# Patient Record
Sex: FEMALE | Race: WHITE | NOT HISPANIC OR LATINO | Employment: OTHER | URBAN - METROPOLITAN AREA
[De-identification: names, ages, dates, MRNs, and addresses within clinical notes are randomized per-mention and may not be internally consistent; named-entity substitution may affect disease eponyms.]

---

## 2017-09-26 ENCOUNTER — EST. PATIENT EMERGENCY (OUTPATIENT)
Dept: URBAN - METROPOLITAN AREA CLINIC 43 | Facility: CLINIC | Age: 57
End: 2017-09-26

## 2017-09-26 DIAGNOSIS — H43.812: ICD-10-CM

## 2017-09-26 DIAGNOSIS — H33.302: ICD-10-CM

## 2017-09-26 PROCEDURE — 92012 INTRM OPH EXAM EST PATIENT: CPT

## 2017-09-26 ASSESSMENT — TONOMETRY
OD_IOP_MMHG: 13
OS_IOP_MMHG: 14

## 2017-09-26 ASSESSMENT — VISUAL ACUITY
OS_CC: 20/25-2
OD_CC: 20/25

## 2017-10-20 ENCOUNTER — DILATED FUNDUS EXAM (OUTPATIENT)
Dept: URBAN - METROPOLITAN AREA CLINIC 43 | Facility: CLINIC | Age: 57
End: 2017-10-20

## 2017-10-20 DIAGNOSIS — H43.812: ICD-10-CM

## 2017-10-20 PROCEDURE — 92012 INTRM OPH EXAM EST PATIENT: CPT

## 2017-10-20 ASSESSMENT — TONOMETRY
OS_IOP_MMHG: 14
OD_IOP_MMHG: 14

## 2017-10-20 ASSESSMENT — VISUAL ACUITY
OS_SC: 20/25
OD_SC: 20/30

## 2018-04-20 ENCOUNTER — EST. PATIENT EMERGENCY (OUTPATIENT)
Dept: URBAN - METROPOLITAN AREA CLINIC 43 | Facility: CLINIC | Age: 58
End: 2018-04-20

## 2018-04-20 DIAGNOSIS — H04.123: ICD-10-CM

## 2018-04-20 PROCEDURE — 99212 OFFICE O/P EST SF 10 MIN: CPT

## 2018-04-20 ASSESSMENT — VISUAL ACUITY
OS_CC: 20/80
OD_CC: J6
OS_CC: J2
OD_SC: 20/25

## 2018-05-21 ENCOUNTER — CONTACT LENS - FOLLOW UP (OUTPATIENT)
Dept: URBAN - METROPOLITAN AREA CLINIC 43 | Facility: CLINIC | Age: 58
End: 2018-05-21

## 2018-05-21 DIAGNOSIS — Z97.3: ICD-10-CM

## 2018-05-21 DIAGNOSIS — H04.123: ICD-10-CM

## 2018-05-21 PROCEDURE — 92310F

## 2018-05-21 ASSESSMENT — VISUAL ACUITY
OS_CC: 20/30+2
OD_CC: J6
OS_CC: J3
OD_CC: 20/25-2
OU_CC: J2
OU_CC: 20/20

## 2018-06-29 ENCOUNTER — ESTABLISHED COMPREHENSIVE EXAM (OUTPATIENT)
Dept: URBAN - METROPOLITAN AREA CLINIC 43 | Facility: CLINIC | Age: 58
End: 2018-06-29

## 2018-06-29 DIAGNOSIS — H43.812: ICD-10-CM

## 2018-06-29 DIAGNOSIS — H04.123: ICD-10-CM

## 2018-06-29 DIAGNOSIS — H25.093: ICD-10-CM

## 2018-06-29 PROCEDURE — 92014 COMPRE OPH EXAM EST PT 1/>: CPT

## 2018-06-29 PROCEDURE — 92015 DETERMINE REFRACTIVE STATE: CPT

## 2018-06-29 ASSESSMENT — VISUAL ACUITY
OD_CC: 20/20-1
OD_SC: J2
OS_CC: J2
OS_CC: 20/20-1
OS_SC: 20/200
OD_SC: 20/200
OD_CC: J1
OS_SC: J2

## 2018-06-29 ASSESSMENT — TONOMETRY
OD_IOP_MMHG: 16
OS_IOP_MMHG: 16

## 2019-07-24 ENCOUNTER — ESTABLISHED COMPREHENSIVE EXAM (OUTPATIENT)
Dept: URBAN - METROPOLITAN AREA CLINIC 43 | Facility: CLINIC | Age: 59
End: 2019-07-24

## 2019-07-24 DIAGNOSIS — H25.093: ICD-10-CM

## 2019-07-24 DIAGNOSIS — H33.302: ICD-10-CM

## 2019-07-24 DIAGNOSIS — H43.812: ICD-10-CM

## 2019-07-24 DIAGNOSIS — H04.123: ICD-10-CM

## 2019-07-24 PROCEDURE — 92015 DETERMINE REFRACTIVE STATE: CPT

## 2019-07-24 PROCEDURE — 92014 COMPRE OPH EXAM EST PT 1/>: CPT

## 2019-07-24 ASSESSMENT — VISUAL ACUITY
OD_SC: J1
OD_SC: 20/400
OS_SC: J8
OS_SC: 20/400

## 2019-07-24 ASSESSMENT — TONOMETRY
OS_IOP_MMHG: 15
OD_IOP_MMHG: 15

## 2020-02-10 ENCOUNTER — EST. PATIENT EMERGENCY (OUTPATIENT)
Dept: URBAN - METROPOLITAN AREA CLINIC 39 | Facility: CLINIC | Age: 60
End: 2020-02-10

## 2020-02-10 DIAGNOSIS — H16.042: ICD-10-CM

## 2020-02-10 DIAGNOSIS — H04.123: ICD-10-CM

## 2020-02-10 DIAGNOSIS — H25.093: ICD-10-CM

## 2020-02-10 PROCEDURE — 92012 INTRM OPH EXAM EST PATIENT: CPT

## 2020-02-10 RX ORDER — OFLOXACIN 3 MG/ML: 1 SOLUTION/ DROPS OPHTHALMIC

## 2020-02-10 RX ORDER — DUREZOL 0.5 MG/ML: 1 EMULSION OPHTHALMIC ONCE A DAY

## 2020-02-10 ASSESSMENT — TONOMETRY: OD_IOP_MMHG: 16

## 2020-02-10 ASSESSMENT — VISUAL ACUITY
OS_CC: 20/25+2
OD_CC: 20/20

## 2020-02-19 ENCOUNTER — FOLLOW UP (OUTPATIENT)
Dept: URBAN - METROPOLITAN AREA CLINIC 43 | Facility: CLINIC | Age: 60
End: 2020-02-19

## 2020-02-19 DIAGNOSIS — H16.042: ICD-10-CM

## 2020-02-19 PROCEDURE — 99212 OFFICE O/P EST SF 10 MIN: CPT

## 2020-02-19 ASSESSMENT — VISUAL ACUITY
OS_CC: 20/30+2
OD_CC: 20/25+2

## 2020-02-19 ASSESSMENT — TONOMETRY: OD_IOP_MMHG: 16

## 2020-07-29 ENCOUNTER — ESTABLISHED COMPREHENSIVE EXAM (OUTPATIENT)
Dept: URBAN - METROPOLITAN AREA CLINIC 43 | Facility: CLINIC | Age: 60
End: 2020-07-29

## 2020-07-29 DIAGNOSIS — H04.123: ICD-10-CM

## 2020-07-29 DIAGNOSIS — H33.302: ICD-10-CM

## 2020-07-29 DIAGNOSIS — H43.812: ICD-10-CM

## 2020-07-29 DIAGNOSIS — H25.813: ICD-10-CM

## 2020-07-29 PROCEDURE — 92014 COMPRE OPH EXAM EST PT 1/>: CPT

## 2020-07-29 PROCEDURE — 92015 DETERMINE REFRACTIVE STATE: CPT

## 2020-07-29 ASSESSMENT — VISUAL ACUITY
OD_CC: 20/25-2
OD_SC: 20/400
OD_SC: J1
OS_CC: 20/25-1
OS_CC: J2
OD_CC: J4
OS_SC: J10
OS_SC: 20/400

## 2020-07-29 ASSESSMENT — TONOMETRY
OS_IOP_MMHG: 15
OD_IOP_MMHG: 17

## 2020-09-22 ENCOUNTER — EST. PATIENT EMERGENCY (OUTPATIENT)
Dept: URBAN - METROPOLITAN AREA CLINIC 43 | Facility: CLINIC | Age: 60
End: 2020-09-22

## 2020-09-22 DIAGNOSIS — H04.123: ICD-10-CM

## 2020-09-22 DIAGNOSIS — H43.812: ICD-10-CM

## 2020-09-22 DIAGNOSIS — H43.811: ICD-10-CM

## 2020-09-22 DIAGNOSIS — H25.813: ICD-10-CM

## 2020-09-22 PROCEDURE — 92134 CPTRZ OPH DX IMG PST SGM RTA: CPT

## 2020-09-22 PROCEDURE — 92014 COMPRE OPH EXAM EST PT 1/>: CPT

## 2020-09-22 ASSESSMENT — TONOMETRY
OD_IOP_MMHG: 12
OS_IOP_MMHG: 14

## 2020-09-22 ASSESSMENT — VISUAL ACUITY
OS_CC: 20/25
OD_CC: 20/25-2

## 2020-11-24 NOTE — PATIENT DISCUSSION
New Prescription: Lotemax SM (loteprednol etabonate): drops,gel: 0.38% 1 drop twice a day into both eyes 11-

## 2020-11-24 NOTE — PATIENT DISCUSSION
EXPOSURE KERATOCUNJUNCTIVITIS: PRESCRIBED PRESERVATIVE FREE ARTIFICIAL TEARS 4-6X A DAY, OU AND THE DAILY INTAKE OF OMEGA-3 DHA/EPA FATTY ACIDS TO HELP RELIEVE SYMPTOMS. ADD NIGHTLY LUBRICATING OINTMENT OR GEL. WILL CONSIDER RESTASIS OR PUNCTAL PLUGS AND/OR LIPIFLOW TREATMENT NEXT VISIT IF NOT RESPONSIVE OR IF SYMPTOMS PERSIST. RETURN FOR FOLLOW-UP AS SCHEDULED OR SOONER IF SYMPTOMS WORSEN.

## 2020-11-24 NOTE — PATIENT DISCUSSION
MEIBOMIAN GLAND DYSFUNCTION, OU:  PRESCRIBE WARM COMPRESSES AND EYELID SCRUBS QD-BID, ARTIFICIAL TEARS BID-QID, THE DAILY INTAKE OF OMEGA-3 FATTY ACIDS . WILL CONSIDER LIPIFLOW TREATMENT NEXT VISIT IF NOT RESPONSIVE TO TREATMENT OR IF SYMPTOMS PERSIST. RETURN FOR FOLLOW-UP AS SCHEDULED.

## 2021-01-26 NOTE — PATIENT DISCUSSION
EXPOSURE KERATOCUNJUNCTIVITIS: PRESCRIBED PRESERVATIVE FREE ARTIFICIAL TEARS 4-6X A DAY, OU AND THE DAILY INTAKE OF OMEGA-3 DHA/EPA FATTY ACIDS TO HELP RELIEVE SYMPTOMS. ADD NIGHTLY LUBRICATING OINTMENT OR GEL.

## 2021-01-26 NOTE — PATIENT DISCUSSION
MEIBOMIAN GLAND DYSFUNCTION, OU: PRESCRIBE WARM COMPRESSES AND EYELID SCRUBS QD-BID, ARTIFICIAL TEARS BID-QID, THE DAILY INTAKE OF OMEGA-3 FATTY ACIDS .

## 2021-01-26 NOTE — PATIENT DISCUSSION
New Prescription: Lotemax SM (loteprednol etabonate): drops,gel: 0.38% 1 drop twice a day as needed into both eyes 01-

## 2021-01-26 NOTE — PATIENT DISCUSSION
K SICCA OU: NO IMPROVEMENT WITH PRESERVATIVE FREE ARTIFICIAL TEARS 4-6X A DAY, OU ADD NIGHTLY LUBRICATING OINTMENT OR GEL. NO TOLERENCE  [address]. OKAY TO CONTINUE LOTEMAX PRN.

## 2021-04-07 NOTE — PATIENT DISCUSSION
New Prescription: Lotemax (loteprednol etabonate): ointment: 0.5% a small amount at bedtime into both eyes 04-

## 2021-07-28 ENCOUNTER — ESTABLISHED COMPREHENSIVE EXAM (OUTPATIENT)
Dept: URBAN - METROPOLITAN AREA CLINIC 43 | Facility: CLINIC | Age: 61
End: 2021-07-28

## 2021-07-28 DIAGNOSIS — H04.123: ICD-10-CM

## 2021-07-28 DIAGNOSIS — H02.834: ICD-10-CM

## 2021-07-28 DIAGNOSIS — H02.831: ICD-10-CM

## 2021-07-28 DIAGNOSIS — H02.403: ICD-10-CM

## 2021-07-28 DIAGNOSIS — H25.813: ICD-10-CM

## 2021-07-28 PROCEDURE — 92014 COMPRE OPH EXAM EST PT 1/>: CPT

## 2021-07-28 PROCEDURE — 92015 DETERMINE REFRACTIVE STATE: CPT

## 2021-07-28 ASSESSMENT — VISUAL ACUITY
OD_SC: 20/400
OS_SC: 20/400
OD_CC: 20/20-1
OS_CC: 20/25
OS_CC: J1
OS_SC: J1
OD_SC: J1
OD_CC: J1

## 2021-07-28 ASSESSMENT — TONOMETRY
OS_IOP_MMHG: 13
OD_IOP_MMHG: 14

## 2021-10-07 ENCOUNTER — ESTABLISHED PATIENT (OUTPATIENT)
Dept: URBAN - METROPOLITAN AREA CLINIC 36 | Facility: CLINIC | Age: 61
End: 2021-10-07

## 2021-10-07 DIAGNOSIS — L98.8: ICD-10-CM

## 2021-10-07 DIAGNOSIS — H02.832: ICD-10-CM

## 2021-10-07 DIAGNOSIS — H25.813: ICD-10-CM

## 2021-10-07 DIAGNOSIS — H02.413: ICD-10-CM

## 2021-10-07 DIAGNOSIS — H02.835: ICD-10-CM

## 2021-10-07 DIAGNOSIS — H04.123: ICD-10-CM

## 2021-10-07 DIAGNOSIS — H02.403: ICD-10-CM

## 2021-10-07 PROCEDURE — 92285 EXTERNAL OCULAR PHOTOGRAPHY: CPT

## 2021-10-07 PROCEDURE — 99213 OFFICE O/P EST LOW 20 MIN: CPT

## 2021-10-07 RX ORDER — OXYMETAZOLINE HYDROCHLORIDE OPHTHALMIC 1 MG/ML: 1 SOLUTION/ DROPS OPHTHALMIC ONCE A DAY

## 2021-10-07 ASSESSMENT — VISUAL ACUITY
OD_CC: 20/25
OS_CC: 20/25

## 2021-10-11 ENCOUNTER — TECH ONLY (OUTPATIENT)
Dept: URBAN - METROPOLITAN AREA CLINIC 43 | Facility: CLINIC | Age: 61
End: 2021-10-11

## 2021-10-11 DIAGNOSIS — H02.403: ICD-10-CM

## 2021-10-11 DIAGNOSIS — H02.839: ICD-10-CM

## 2021-10-11 DIAGNOSIS — H02.413: ICD-10-CM

## 2021-10-11 PROCEDURE — 92082 INTERMEDIATE VISUAL FIELD XM: CPT

## 2021-10-11 PROCEDURE — 99211T TECH SERVICE

## 2022-01-25 ENCOUNTER — CONSULTATION/EVALUATION (OUTPATIENT)
Dept: URBAN - METROPOLITAN AREA CLINIC 43 | Facility: CLINIC | Age: 62
End: 2022-01-25

## 2022-01-25 DIAGNOSIS — H43.812: ICD-10-CM

## 2022-01-25 DIAGNOSIS — H33.302: ICD-10-CM

## 2022-01-25 DIAGNOSIS — H43.811: ICD-10-CM

## 2022-01-25 DIAGNOSIS — H04.123: ICD-10-CM

## 2022-01-25 DIAGNOSIS — H25.811: ICD-10-CM

## 2022-01-25 DIAGNOSIS — H25.812: ICD-10-CM

## 2022-01-25 PROCEDURE — 92025AV CORNEAL TOPOGRAPHY, AV

## 2022-01-25 PROCEDURE — 92015 DETERMINE REFRACTIVE STATE: CPT

## 2022-01-25 PROCEDURE — 92136TC INTERFEROMETRY - TECHNICAL COMPONENT

## 2022-01-25 PROCEDURE — 99214 OFFICE O/P EST MOD 30 MIN: CPT

## 2022-01-25 PROCEDURE — V2799PMN IMPRIMIS PRED-MOXI-NEPAF 5ML

## 2022-01-25 PROCEDURE — 92134 CPTRZ OPH DX IMG PST SGM RTA: CPT

## 2022-01-25 ASSESSMENT — VISUAL ACUITY
OS_BAT: 20/50
OS_CC: J2
OD_CC: J3
OD_SC: 20/200
OD_RAM: 20/20
OS_SC: J2
OS_SC: 20/400
OS_AM: 20/20
OD_CC: 20/30+2
OS_CC: 20/25-2
OD_BAT: 20/50
OD_SC: J1

## 2022-01-25 ASSESSMENT — TONOMETRY
OS_IOP_MMHG: 15
OD_IOP_MMHG: 14

## 2022-02-17 ENCOUNTER — PRE-OP/H&P (OUTPATIENT)
Dept: URBAN - METROPOLITAN AREA CLINIC 44 | Facility: CLINIC | Age: 62
End: 2022-02-17

## 2022-02-17 DIAGNOSIS — H02.413: ICD-10-CM

## 2022-02-17 DIAGNOSIS — H02.835: ICD-10-CM

## 2022-02-17 DIAGNOSIS — H04.123: ICD-10-CM

## 2022-02-17 DIAGNOSIS — H02.832: ICD-10-CM

## 2022-02-17 DIAGNOSIS — L98.8: ICD-10-CM

## 2022-02-17 DIAGNOSIS — H02.403: ICD-10-CM

## 2022-02-17 DIAGNOSIS — H25.813: ICD-10-CM

## 2022-02-17 PROCEDURE — 99211HP H&P OFFICE/OUTPATIENT VISIT, EST

## 2022-02-17 RX ORDER — CEPHALEXIN 500 MG/1
1 CAPSULE ORAL TWICE A DAY
Start: 2022-03-08

## 2022-02-17 RX ORDER — ONDANSETRON HYDROCHLORIDE 8 MG/1
TABLET, FILM COATED ORAL ONCE A DAY
Start: 2022-03-08

## 2022-02-17 RX ORDER — TOBRAMYCIN AND DEXAMETHASONE 1; 3 MG/ML; MG/ML
1 SUSPENSION/ DROPS OPHTHALMIC
Start: 2022-03-08

## 2022-02-17 RX ORDER — ERYTHROMYCIN 5 MG/G
OINTMENT OPHTHALMIC
Start: 2022-03-08

## 2022-03-21 ENCOUNTER — PRE-OP/H&P (OUTPATIENT)
Dept: URBAN - METROPOLITAN AREA SURGERY 14 | Facility: SURGERY | Age: 62
End: 2022-03-21

## 2022-03-21 ENCOUNTER — SURGERY/PROCEDURE (OUTPATIENT)
Dept: URBAN - METROPOLITAN AREA SURGERY 14 | Facility: SURGERY | Age: 62
End: 2022-03-21

## 2022-03-21 DIAGNOSIS — H02.403: ICD-10-CM

## 2022-03-21 DIAGNOSIS — H02.835: ICD-10-CM

## 2022-03-21 DIAGNOSIS — H02.832: ICD-10-CM

## 2022-03-21 DIAGNOSIS — Z41.1: ICD-10-CM

## 2022-03-21 DIAGNOSIS — L98.8: ICD-10-CM

## 2022-03-21 DIAGNOSIS — H02.413: ICD-10-CM

## 2022-03-21 PROCEDURE — 15829F LOWER FACELIFT/PLATYSMAPLASTY ~ FEMALE

## 2022-03-21 PROCEDURE — 15821 BLEPHARP LWR EYELID FAT PAD: CPT

## 2022-03-21 PROCEDURE — 67904SF LEVATOR APONEUROSIS ADV W/ FAT COSMETIC PER EYE

## 2022-03-21 PROCEDURE — 99211T TECH SERVICE

## 2022-03-21 PROCEDURE — 67904 REPAIR EYELID DEFECT: CPT

## 2022-03-22 ENCOUNTER — POST-OP (OUTPATIENT)
Dept: URBAN - METROPOLITAN AREA CLINIC 39 | Facility: CLINIC | Age: 62
End: 2022-03-22

## 2022-03-22 DIAGNOSIS — Z98.890: ICD-10-CM

## 2022-03-22 PROCEDURE — 99024 POSTOP FOLLOW-UP VISIT: CPT

## 2022-03-22 ASSESSMENT — VISUAL ACUITY
OD_SC: 20/40
OS_SC: 20/40

## 2022-03-28 ENCOUNTER — POST-OP (OUTPATIENT)
Dept: URBAN - METROPOLITAN AREA CLINIC 39 | Facility: CLINIC | Age: 62
End: 2022-03-28

## 2022-03-28 DIAGNOSIS — Z98.890: ICD-10-CM

## 2022-03-28 DIAGNOSIS — H02.403: ICD-10-CM

## 2022-03-28 PROCEDURE — 99024 POSTOP FOLLOW-UP VISIT: CPT

## 2022-03-28 ASSESSMENT — VISUAL ACUITY
OD_CC: 20/25
OS_CC: 20/25-1

## 2022-04-07 ENCOUNTER — POST-OP (OUTPATIENT)
Dept: URBAN - METROPOLITAN AREA CLINIC 43 | Facility: CLINIC | Age: 62
End: 2022-04-07

## 2022-04-07 DIAGNOSIS — Z98.890: ICD-10-CM

## 2022-04-07 PROCEDURE — 99024 POSTOP FOLLOW-UP VISIT: CPT

## 2022-04-07 ASSESSMENT — VISUAL ACUITY
OS_CC: 20/25
OD_CC: 20/25

## 2022-04-25 NOTE — PROCEDURE NOTE: CLINICAL
PROCEDURE NOTE: Punctal Plugs, Extended Bilateral Lower Lids. Diagnosis: Keratoconjunctivitis Sicca, Not Specified As Sjögren's. Prior to treatment, the risks/benefits/alternatives were discussed. The patient wished to proceed with procedure. Extended duration plugs were inserted. Brand: Oasis Form Fit. Size: 0.3mmx2.5mm Location: bilateral lower punctum. Patient tolerated procedure well. There were no complications. Post procedure instructions given. Luis Daniel Colby

## 2022-05-03 ENCOUNTER — POST-OP (OUTPATIENT)
Dept: URBAN - METROPOLITAN AREA CLINIC 39 | Facility: CLINIC | Age: 62
End: 2022-05-03

## 2022-05-03 DIAGNOSIS — Z98.890: ICD-10-CM

## 2022-05-03 PROCEDURE — 99024 POSTOP FOLLOW-UP VISIT: CPT

## 2022-05-03 ASSESSMENT — VISUAL ACUITY
OS_CC: 20/25
OD_CC: 20/25

## 2022-09-12 ENCOUNTER — CONSULTATION/EVALUATION (OUTPATIENT)
Dept: URBAN - METROPOLITAN AREA CLINIC 39 | Facility: CLINIC | Age: 62
End: 2022-09-12

## 2022-09-12 DIAGNOSIS — H43.811: ICD-10-CM

## 2022-09-12 DIAGNOSIS — H04.123: ICD-10-CM

## 2022-09-12 DIAGNOSIS — H35.371: ICD-10-CM

## 2022-09-12 DIAGNOSIS — H43.812: ICD-10-CM

## 2022-09-12 DIAGNOSIS — H25.813: ICD-10-CM

## 2022-09-12 DIAGNOSIS — H52.13: ICD-10-CM

## 2022-09-12 PROCEDURE — 92025AV CORNEAL TOPOGRAPHY, AV

## 2022-09-12 PROCEDURE — 92136TC INTERFEROMETRY - TECHNICAL COMPONENT

## 2022-09-12 PROCEDURE — 92134 CPTRZ OPH DX IMG PST SGM RTA: CPT

## 2022-09-12 PROCEDURE — V2799PMN IMPRIMIS PRED-MOXI-NEPAF 5ML

## 2022-09-12 PROCEDURE — 99214 OFFICE O/P EST MOD 30 MIN: CPT

## 2022-09-12 PROCEDURE — 92015 DETERMINE REFRACTIVE STATE: CPT

## 2022-09-12 ASSESSMENT — VISUAL ACUITY
OS_BAT: 20/50
OS_CC: J1
OD_SC: 20/200
OD_CC: J1
OS_CC: 20/20-1
OD_SC: J1
OS_SC: J1
OD_CC: 20/20-1
OS_SC: 20/400
OD_BAT: 20/50

## 2022-09-12 ASSESSMENT — TONOMETRY
OS_IOP_MMHG: 16
OD_IOP_MMHG: 16

## 2022-09-20 ENCOUNTER — PRE-OP/H&P (OUTPATIENT)
Dept: URBAN - METROPOLITAN AREA CLINIC 39 | Facility: CLINIC | Age: 62
End: 2022-09-20

## 2022-09-20 ENCOUNTER — SURGERY/PROCEDURE (OUTPATIENT)
Dept: URBAN - METROPOLITAN AREA CLINIC 39 | Facility: CLINIC | Age: 62
End: 2022-09-20

## 2022-09-20 DIAGNOSIS — Z98.890: ICD-10-CM

## 2022-09-20 DIAGNOSIS — H04.123: ICD-10-CM

## 2022-09-20 DIAGNOSIS — H35.371: ICD-10-CM

## 2022-09-20 DIAGNOSIS — H25.813: ICD-10-CM

## 2022-09-20 DIAGNOSIS — H02.403: ICD-10-CM

## 2022-09-20 PROCEDURE — 65772LRI LRI DURING CAT SX

## 2022-09-20 PROCEDURE — 99211T TECH SERVICE

## 2022-09-20 PROCEDURE — 66984AV REMOVE CATARACT, INSERT ADVANCED LENS

## 2022-09-20 PROCEDURE — 66999LNSR LENSAR LASER FOR CAT SX

## 2022-09-21 ENCOUNTER — POST-OP (OUTPATIENT)
Dept: URBAN - METROPOLITAN AREA CLINIC 43 | Facility: CLINIC | Age: 62
End: 2022-09-21

## 2022-09-21 DIAGNOSIS — Z96.1: ICD-10-CM

## 2022-09-21 PROCEDURE — 99024 POSTOP FOLLOW-UP VISIT: CPT

## 2022-09-21 ASSESSMENT — TONOMETRY
OS_IOP_MMHG: 14
OD_IOP_MMHG: 14

## 2022-09-21 ASSESSMENT — VISUAL ACUITY
OS_SC: 20/30-1
OD_SC: J1
OD_SC: 20/200
OS_SC: J1
OS: J4

## 2022-09-23 ENCOUNTER — POST OP/EVAL OF SECOND EYE (OUTPATIENT)
Dept: URBAN - METROPOLITAN AREA CLINIC 43 | Facility: CLINIC | Age: 62
End: 2022-09-23

## 2022-09-23 DIAGNOSIS — H33.302: ICD-10-CM

## 2022-09-23 DIAGNOSIS — H35.371: ICD-10-CM

## 2022-09-23 DIAGNOSIS — Z98.890: ICD-10-CM

## 2022-09-23 DIAGNOSIS — H52.13: ICD-10-CM

## 2022-09-23 DIAGNOSIS — H25.811: ICD-10-CM

## 2022-09-23 DIAGNOSIS — Z96.1: ICD-10-CM

## 2022-09-23 DIAGNOSIS — H02.403: ICD-10-CM

## 2022-09-23 PROCEDURE — 92012 INTRM OPH EXAM EST PATIENT: CPT

## 2022-09-23 PROCEDURE — P6698455 NON-COMANAGED ADVANCED PO

## 2022-09-23 ASSESSMENT — VISUAL ACUITY
OS_SC: 20/25-2
OD_SC: 20/200
OD_SC: J1
OD_BAT: 20/50
OS_SC: J1-

## 2022-09-23 ASSESSMENT — TONOMETRY
OD_IOP_MMHG: 14
OS_IOP_MMHG: 14

## 2022-09-27 ENCOUNTER — SURGERY/PROCEDURE (OUTPATIENT)
Dept: URBAN - METROPOLITAN AREA CLINIC 39 | Facility: CLINIC | Age: 62
End: 2022-09-27

## 2022-09-27 ENCOUNTER — POST-OP (OUTPATIENT)
Dept: URBAN - METROPOLITAN AREA CLINIC 39 | Facility: CLINIC | Age: 62
End: 2022-09-27

## 2022-09-27 ENCOUNTER — PRE-OP/H&P (OUTPATIENT)
Dept: URBAN - METROPOLITAN AREA CLINIC 39 | Facility: CLINIC | Age: 62
End: 2022-09-27

## 2022-09-27 DIAGNOSIS — Z98.890: ICD-10-CM

## 2022-09-27 DIAGNOSIS — H25.811: ICD-10-CM

## 2022-09-27 DIAGNOSIS — H02.403: ICD-10-CM

## 2022-09-27 DIAGNOSIS — Z96.1: ICD-10-CM

## 2022-09-27 PROCEDURE — 66984AV REMOVE CATARACT, INSERT ADVANCED LENS

## 2022-09-27 PROCEDURE — 99211T TECH SERVICE

## 2022-09-27 PROCEDURE — 65772LRI LRI DURING CAT SX

## 2022-09-27 PROCEDURE — 66999LNSR LENSAR LASER FOR CAT SX

## 2022-09-27 ASSESSMENT — TONOMETRY: OD_IOP_MMHG: 10

## 2022-09-27 ASSESSMENT — VISUAL ACUITY: OD_SC: 20/70-2

## 2022-10-07 ENCOUNTER — POST-OP (OUTPATIENT)
Dept: URBAN - METROPOLITAN AREA CLINIC 43 | Facility: CLINIC | Age: 62
End: 2022-10-07

## 2022-10-07 DIAGNOSIS — Z96.1: ICD-10-CM

## 2022-10-07 DIAGNOSIS — Z98.890: ICD-10-CM

## 2022-10-07 PROCEDURE — 99024 POSTOP FOLLOW-UP VISIT: CPT

## 2022-10-07 ASSESSMENT — VISUAL ACUITY
OS_SC: 20/20
OD_SC: J6
OU_SC: 20/20
OD_SC: 20/25-2
OS_SC: J2
OU_SC: J1

## 2022-10-07 ASSESSMENT — TONOMETRY
OD_IOP_MMHG: 15
OS_IOP_MMHG: 16

## 2022-12-13 ENCOUNTER — COMPREHENSIVE EXAM (OUTPATIENT)
Dept: URBAN - METROPOLITAN AREA CLINIC 43 | Facility: CLINIC | Age: 62
End: 2022-12-13

## 2022-12-13 PROCEDURE — 92014 COMPRE OPH EXAM EST PT 1/>: CPT

## 2022-12-13 PROCEDURE — 92015 DETERMINE REFRACTIVE STATE: CPT

## 2022-12-13 ASSESSMENT — TONOMETRY
OD_IOP_MMHG: 14
OS_IOP_MMHG: 14

## 2022-12-13 ASSESSMENT — VISUAL ACUITY
OS_CC: J1
OD_SC: J8
OU_SC: J1
OU_SC: 20/25
OD_SC: 20/25-1
OD_CC: J1
OU_CC: J1
OS_SC: J1
OS_SC: 20/25-1

## 2023-08-22 ENCOUNTER — EMERGENCY VISIT (OUTPATIENT)
Dept: URBAN - METROPOLITAN AREA CLINIC 43 | Facility: CLINIC | Age: 63
End: 2023-08-22

## 2023-08-22 DIAGNOSIS — H04.123: ICD-10-CM

## 2023-08-22 PROCEDURE — 68761S PUNCTUM PLUG / SILICONE,EACH

## 2023-08-22 PROCEDURE — A4263 PERMANENT TEAR DUCT PLUG: HCPCS

## 2023-08-22 ASSESSMENT — VISUAL ACUITY
OS_SC: J1
OD_SC: 20/20
OD_SC: J8
OU_SC: J1
OS_SC: 20/20
OU_SC: 20/20

## 2023-08-22 ASSESSMENT — TONOMETRY
OS_IOP_MMHG: 14
OD_IOP_MMHG: 14

## 2024-04-19 ENCOUNTER — COMPREHENSIVE EXAM (OUTPATIENT)
Dept: URBAN - METROPOLITAN AREA CLINIC 43 | Facility: CLINIC | Age: 64
End: 2024-04-19

## 2024-04-19 DIAGNOSIS — H52.13: ICD-10-CM

## 2024-04-19 DIAGNOSIS — H43.813: ICD-10-CM

## 2024-04-19 DIAGNOSIS — H04.123: ICD-10-CM

## 2024-04-19 DIAGNOSIS — H35.371: ICD-10-CM

## 2024-04-19 PROCEDURE — 92014 COMPRE OPH EXAM EST PT 1/>: CPT

## 2024-04-19 PROCEDURE — 92015 DETERMINE REFRACTIVE STATE: CPT

## 2024-04-19 ASSESSMENT — VISUAL ACUITY
OS_CC: J2
OS_SC: J1
OD_SC: 20/20-1
OD_SC: J8
OS_SC: 20/20-1
OD_CC: J1

## 2024-04-19 ASSESSMENT — TONOMETRY
OD_IOP_MMHG: 13
OS_IOP_MMHG: 13

## 2024-11-23 NOTE — PATIENT DISCUSSION
[facility] here at this time to transport pt to [facility].   K SICCA OU: NO IMPROVEMENT WITH PRESERVATIVE FREE ARTIFICIAL TEARS 4-6X A DAY, OU  ADD NIGHTLY LUBRICATING OINTMENT OR GEL. NO TOLERENCE  [address]. PRESCRIBE LOTEMAX SM BID OU X 1 MONTH. WILL CONSIDER RESTASIS AD FU. ADD LL PUNCTAL PLUGS OU TODAY.

## 2025-04-25 ENCOUNTER — COMPREHENSIVE EXAM (OUTPATIENT)
Age: 65
End: 2025-04-25

## 2025-04-25 DIAGNOSIS — H04.123: ICD-10-CM

## 2025-04-25 DIAGNOSIS — H43.813: ICD-10-CM

## 2025-04-25 DIAGNOSIS — H52.203: ICD-10-CM

## 2025-04-25 DIAGNOSIS — H35.371: ICD-10-CM

## 2025-04-25 PROCEDURE — 92014 COMPRE OPH EXAM EST PT 1/>: CPT

## 2025-04-25 PROCEDURE — 92015 DETERMINE REFRACTIVE STATE: CPT
